# Patient Record
Sex: MALE | Race: WHITE | Employment: OTHER | URBAN - METROPOLITAN AREA
[De-identification: names, ages, dates, MRNs, and addresses within clinical notes are randomized per-mention and may not be internally consistent; named-entity substitution may affect disease eponyms.]

---

## 2017-01-10 RX ORDER — CLONAZEPAM 1 MG/1
TABLET ORAL
Qty: 60 TAB | Refills: 0 | Status: SHIPPED | OUTPATIENT
Start: 2017-01-10 | End: 2017-02-10 | Stop reason: SDUPTHER

## 2017-01-10 NOTE — TELEPHONE ENCOUNTER
Approved clonazepam refill.   Please notify patient that he will need to be seen for 3 month follow-up before the next refill

## 2017-02-09 RX ORDER — CLONAZEPAM 1 MG/1
TABLET ORAL
Qty: 60 TAB | Refills: 0 | OUTPATIENT
Start: 2017-02-09

## 2017-02-09 NOTE — TELEPHONE ENCOUNTER
Refused clonazepam refill. Last appointment October. Needs to be seen every 3 months for controlled substance refill.   Needs appointment

## 2017-02-10 RX ORDER — CLONAZEPAM 1 MG/1
1 TABLET ORAL
Qty: 60 TAB | Refills: 0 | Status: SHIPPED | OUTPATIENT
Start: 2017-02-10 | End: 2017-04-25 | Stop reason: DRUGHIGH

## 2017-02-10 NOTE — TELEPHONE ENCOUNTER
Approved clonazepam refill. I would rather see him sooner than April 25 before the next refill. Has been too long since his last appointment and really need to see him every 3 months.

## 2017-02-10 NOTE — TELEPHONE ENCOUNTER
Chief Complaint   Patient presents with    Medication Refill     Last refill:   4 weeks ago (1/10/2017)        clonazePAM (KLONOPIN) 1 mg tablet       TAKE 1 TABLET BY MOUTH TWICE DAILY AS NEEDED FOR ANXIETY       Dispense: 60 Tab     Refills: 0     Start: 1/10/2017     By: Kathy Mendes MD       : 1/11/17

## 2017-02-10 NOTE — TELEPHONE ENCOUNTER
Patient requesting refill.  He has an appointment on April 25 with Dr Jose Conklin, but if he needs to make a sooner appointment to get his med, he can. callback number is [phone]

## 2017-04-25 ENCOUNTER — HOSPITAL ENCOUNTER (OUTPATIENT)
Dept: LAB | Age: 72
Discharge: HOME | End: 2017-04-25
Payer: MEDICARE

## 2017-04-25 ENCOUNTER — TELEPHONE (OUTPATIENT)
Dept: FAMILY MEDICINE CLINIC | Age: 72
End: 2017-04-25

## 2017-04-25 ENCOUNTER — OFFICE VISIT (OUTPATIENT)
Dept: FAMILY MEDICINE CLINIC | Age: 72
End: 2017-04-25

## 2017-04-25 VITALS
SYSTOLIC BLOOD PRESSURE: 162 MMHG | WEIGHT: 208 LBS | OXYGEN SATURATION: 98 % | TEMPERATURE: 98 F | HEART RATE: 83 BPM | BODY MASS INDEX: 30.81 KG/M2 | DIASTOLIC BLOOD PRESSURE: 84 MMHG | RESPIRATION RATE: 16 BRPM | HEIGHT: 69 IN

## 2017-04-25 DIAGNOSIS — F41.9 ANXIETY: Primary | ICD-10-CM

## 2017-04-25 DIAGNOSIS — Z11.59 NEED FOR HEPATITIS C SCREENING TEST: ICD-10-CM

## 2017-04-25 DIAGNOSIS — Z23 ENCOUNTER FOR IMMUNIZATION: ICD-10-CM

## 2017-04-25 DIAGNOSIS — R73.02 IGT (IMPAIRED GLUCOSE TOLERANCE): ICD-10-CM

## 2017-04-25 DIAGNOSIS — I10 ESSENTIAL HYPERTENSION: ICD-10-CM

## 2017-04-25 DIAGNOSIS — E78.5 HYPERLIPIDEMIA, UNSPECIFIED HYPERLIPIDEMIA TYPE: ICD-10-CM

## 2017-04-25 PROCEDURE — 83036 HEMOGLOBIN GLYCOSYLATED A1C: CPT

## 2017-04-25 PROCEDURE — 86803 HEPATITIS C AB TEST: CPT

## 2017-04-25 PROCEDURE — 36415 COLL VENOUS BLD VENIPUNCTURE: CPT

## 2017-04-25 PROCEDURE — 80061 LIPID PANEL: CPT

## 2017-04-25 PROCEDURE — 80053 COMPREHEN METABOLIC PANEL: CPT

## 2017-04-25 RX ORDER — CLONAZEPAM 0.5 MG/1
0.5 TABLET ORAL
Qty: 60 TAB | Refills: 0 | Status: SHIPPED | OUTPATIENT
Start: 2017-04-25

## 2017-04-25 RX ORDER — SERTRALINE HYDROCHLORIDE 25 MG/1
25 TABLET, FILM COATED ORAL DAILY
Qty: 90 TAB | Refills: 1 | Status: SHIPPED | OUTPATIENT
Start: 2017-04-25

## 2017-04-25 NOTE — PROGRESS NOTES
SANAZ  Aamir Salazar is a 80 y.o. male who presents to follow-up on his chronic medical conditions but ended up spending the entire visit talking about his anxiety. Has a remarkable amount of insight because of the reading he has done. By way of update he told me what has been going on for the last few years. For about 45 years he has had a headache . Has been to neurologist had a scan, took a \"whole bunch of pills\" but nothing worked. He cleaned his house which reduced his headaches by half almost instantly. Says that he let his house get a little bit dirty since his wife  years ago. After his wife  he went through a period of morning for about a year before he started to experience the symptoms of anxiety. Was started on clonazepam 1 mg twice daily which he has taken habitually since. Saw on the news that benzos are bad and tried to wean himself off and got as far as 1x 1 mg tab in the afternoon but was unable to wean himself further than that because he was having some mild hightened states of anxiety and some mild panic attacks. To his credit he recognizes some of these as \"fake heart attack\". He is very nervous about having another heart attack and will sometimes get chest pain that goes to his left arm. Ends up using Aspercreme which helps to massage the pain away. Takes reassurance and the fact that if this topical therapy works then it is unlikely to be a heart attack. Does not tell me how often this is happening. The other kind of panic attack feels like it is affecting his entire body, his legs get rubbery and weak. Actually says \"it feels like there is a homunculus in my head screwing with me\".   Seems like these happen more often at night, actually wake him up multiple times a week    PMHx:  Past Medical History:   Diagnosis Date    Anxiety 2010    CAD (coronary artery disease) 2010    Headache     Hyperglycemia 2010    Hyperlipidemia 2010  Hypertension 2/22/2010       Meds:   Current Outpatient Prescriptions   Medication Sig Dispense Refill    clonazePAM (KLONOPIN) 0.5 mg tablet Take 1 Tab by mouth nightly as needed. Max Daily Amount: 0.5 mg. 60 Tab 0    diph,pertuss,acel,,tetanus vac,PF, (ADACEL) 2 Lf-(2.5-5-3-5 mcg)-5Lf/0.5 mL syrg vaccine 0.5 mL by IntraMUSCular route once for 1 dose. 0.5 mL 0    sertraline (ZOLOFT) 25 mg tablet Take 1 Tab by mouth daily. 90 Tab 1    omeprazole (PRILOSEC) 20 mg capsule Take 1 Cap by mouth daily. 90 Cap 3    simvastatin (ZOCOR) 40 mg tablet Take 1 Tab by mouth nightly. 60 Tab 5    losartan (COZAAR) 50 mg tablet Take 1 Tab by mouth daily. 90 Tab 3    metoprolol succinate (TOPROL-XL) 50 mg XL tablet TAKE 1 TABLET BY MOUTH TWO (2) TIMES A DAY FOR HEART 180 Tab 3    loratadine (CLARITIN) 10 mg tablet Take 10 mg by mouth.  aspirin (ASPIRIN) 325 mg tablet Take 325 mg by mouth daily.  omega-3 fatty acids-vitamin e (FISH OIL) 1,000 mg Cap Take  by mouth two (2) times a day. Allergies:   No Known Allergies    Smoker:  History   Smoking Status    Never Smoker   Smokeless Tobacco    Never Used       ETOH:   History   Alcohol Use    4.8 oz/week    8 Glasses of wine per week       FH:   Family History   Problem Relation Age of Onset    Heart Disease Mother     Psychiatric Disorder Father     Dementia Father     Heart Disease Brother        ROS:  As listed in HPI. In addition:  Constitutional:   No headache, fever, fatigue, weight loss or weight gain      Eyes:   No redness, pruritis, pain, visual changes, swelling, or discharge      Ears:    No pain, loss or changes in hearing     Cardiac:    No chest pain      Resp:   No cough or shortness of breath      Neuro   No loss of consciousness, dizziness, seizures      Physical Exam:  Blood pressure 162/84, pulse 83, temperature 98 °F (36.7 °C), resp. rate 16, height 5' 9\" (1.753 m), weight 208 lb (94.3 kg), SpO2 98 %. GEN: No apparent distress. Alert and oriented and responds to all questions appropriately. NEUROLOGIC:  No focal neurologic deficits. Strength and sensation grossly intact. Coordination and gait grossly intact. EXT: Well perfused. No edema. SKIN: No obvious rashes. Assessment and Plan     Went over the biologic basis for anxiety. I agree that he needs to come off the clonazepam.  Talked about controller medicines versus benzos. Talked about why benzos are bad for sleep. He is on board, agrees to Zoloft ramp-up. After 4 weeks we will start trying to back off on clonazepam.  I think a reasonable goal will be to switch to Ativan for occasional and as needed use for the panic attacks. Explained the panic attacks will decrease in frequency with appropriate use of Zoloft but might still happen on occasion. Declines therapy, this is unfortunate because I think he would derive great benefit. Did not discuss exercise    He is writing a book. Wants to finish that and then he feels like his life work would be done. Did mention that he thinks about death and said \"I kind of want to die\" but denies suicidal ideation. Said this in a world weary way by way of declining preventative healthcare    He really does not want to [facility] to [facility].  Anything that we do he wants to handle in house here if at all possible. We did not go over his health maintenance in detail because not enough time so I will address this in the future. Went ahead and sudhakar all of his surveillance labs. We will need to talk about a FIT test sometime in the near future. Had a glaucoma screening done recently but does not remember where, he will let us know          ICD-10-CM ICD-9-CM    1. Anxiety F41.9 300.00 clonazePAM (KLONOPIN) 0.5 mg tablet      sertraline (ZOLOFT) 25 mg tablet   2. Essential hypertension P98 249.2 METABOLIC PANEL, COMPREHENSIVE   3.  Hyperlipidemia, unspecified hyperlipidemia type E78.5 272.4 LIPID PANEL   4. Encounter for immunization Z23 V03.89 diph,pertuss,acel,,tetanus vac,PF, (ADACEL) 2 Lf-(2.5-5-3-5 mcg)-5Lf/0.5 mL syrg vaccine   5. Need for hepatitis C screening test Z11.59 V73.89 HCV AB W/RFLX TO KATHE   6. IGT (impaired glucose tolerance) R73.02 790.22 HEMOGLOBIN A1C WITH EAG       AVS given.  Pt expressed understanding of instructions

## 2017-04-25 NOTE — PROGRESS NOTES
.  Chief Complaint   Patient presents with    Hypertension    Annual Wellness Visit     . Missy Palomino

## 2017-04-25 NOTE — MR AVS SNAPSHOT
Visit Information Date & Time Provider Department Dept. Phone Encounter #  
 4/25/2017  8:40 AM Al Ocampo MD [address] [facility] [phone] [id number] Upcoming Health Maintenance Date Due Hepatitis C Screening 1945 DTaP/Tdap/Td series (1 - Tdap) 11/24/1967 FOBT Q 1 YEAR AGE 50-75 11/24/1996 GLAUCOMA SCREENING Q2Y 11/24/2011 MEDICARE YEARLY EXAM 10/28/2016 Allergies as of 4/25/2017  Review Complete On: 4/25/2017 By: Ervin Mesa No Known Allergies Current Immunizations  Reviewed on 10/26/2016 Name Date Influenza High Dose Vaccine PF 10/26/2016, 10/28/2015, 10/28/2014 Influenza Vaccine 10/8/2013 Influenza Vaccine Split 9/11/2012, 10/20/2011, 10/19/2010 Pneumococcal Conjugate (PCV-13) 4/28/2015 Pneumococcal Vaccine (Unspecified Type) 8/3/2012 Zoster Vaccine, Live 1/1/2005 Not reviewed this visit You Were Diagnosed With   
  
 Codes Comments Anxiety    -  Primary ICD-10-CM: F41.9 ICD-9-CM: 300.00 Essential hypertension     ICD-10-CM: I10 
ICD-9-CM: 401.9 Hyperlipidemia, unspecified hyperlipidemia type     ICD-10-CM: E78.5 ICD-9-CM: 272.4 Encounter for immunization     ICD-10-CM: O45 ICD-9-CM: V03.89 Need for hepatitis C screening test     ICD-10-CM: Z11.59 
ICD-9-CM: V73.89 IGT (impaired glucose tolerance)     ICD-10-CM: R73.02 
ICD-9-CM: 790.22 Vitals BP Pulse Temp Resp Height(growth percentile) Weight(growth percentile) 162/84 (BP 1 Location: Left arm, BP Patient Position: Sitting) 83 98 °F (36.7 °C) 16 5' 9\" (1.753 m) 208 lb (94.3 kg) SpO2 BMI Smoking Status 98% 30.72 kg/m2 Never Smoker BMI and BSA Data Body Mass Index Body Surface Area 30.72 kg/m 2 2.14 m 2 Preferred Pharmacy Pharmacy Name Phone [facility] [address] [phone] Your Updated Medication List  
  
   
 This list is accurate as of: 17  9:25 AM.  Always use your most recent med list.  
  
  
  
  
 aspirin 325 mg tablet Commonly known as:  ASPIRIN Take 325 mg by mouth daily. CLARITIN 10 mg tablet Generic drug:  loratadine Take 10 mg by mouth.  
  
 clonazePAM 0.5 mg tablet Commonly known as:  Elsa Tariq Take 1 Tab by mouth nightly as needed. Max Daily Amount: 0.5 mg.  
  
 diph,pertuss(acel),tetanus vac(PF) 2 Lf-(2.5-5-3-5 mcg)-5Lf/0.5 mL Syrg vaccine Commonly known as:  ADACEL  
0.5 mL by IntraMUSCular route once for 1 dose. FISH OIL 1,000 mg Cap Generic drug:  omega-3 fatty acids-vitamin e Take  by mouth two (2) times a day. losartan 50 mg tablet Commonly known as:  COZAAR Take 1 Tab by mouth daily. metoprolol succinate 50 mg XL tablet Commonly known as:  TOPROL-XL  
TAKE 1 TABLET BY MOUTH TWO (2) TIMES A DAY FOR HEART  
  
 omeprazole 20 mg capsule Commonly known as:  PRILOSEC Take 1 Cap by mouth daily. sertraline 25 mg tablet Commonly known as:  ZOLOFT Take 1 Tab by mouth daily. simvastatin 40 mg tablet Commonly known as:  ZOCOR Take 1 Tab by mouth nightly. Prescriptions Printed Refills  
 clonazePAM (KLONOPIN) 0.5 mg tablet 0 Sig: Take 1 Tab by mouth nightly as needed. Max Daily Amount: 0.5 mg.  
 Class: Print Route: Oral  
 diph,pertuss,acel,,tetanus vac,PF, (ADACEL) 2 Lf-(2.5-5-3-5 mcg)-5Lf/0.5 mL syrg vaccine 0 Si.5 mL by IntraMUSCular route once for 1 dose. Class: Print Route: IntraMUSCular Prescriptions Sent to Pharmacy Refills  
 sertraline (ZOLOFT) 25 mg tablet 1 Sig: Take 1 Tab by mouth daily. Class: Normal  
 Pharmacy: [facility] [id number], [address] [place] [address] #: [phone] Route: Oral  
  
We Performed the Following HCV AB W/RFLX TO KATHE [03175 CPT(R)] HEMOGLOBIN A1C WITH EAG [67891 CPT(R)] LIPID PANEL [31175 CPT(R)] METABOLIC PANEL, COMPREHENSIVE [34473 CPT(R)] Introducing \[facility]\"" & HEALTH SERVICES! Ghanshyam Velázquez introduces [employer] patient portal. Now you can access parts of your medical record, email your doctor's office, and request medication refills online. 1. In your internet browser, go to [email or web address] 2. Click on the First Time User? Click Here link in the Sign In box. You will see the New Member Sign Up page. 3. Enter your [employer] Access Code exactly as it appears below. You will not need to use this code after youve completed the sign-up process. If you do not sign up before the expiration date, you must request a new code. · [employer] Access Code: [email or web address] Expires: 7/24/2017  9:25 AM 
 
4. Enter the last four digits of your Social Security Number (xxxx) and Date of Birth (mm/dd/yyyy) as indicated and click Submit. You will be taken to the next sign-up page. 5. Create a [employer] ID. This will be your [employer] login ID and cannot be changed, so think of one that is secure and easy to remember. 6. Create a [employer] password. You can change your password at any time. 7. Enter your Password Reset Question and Answer. This can be used at a later time if you forget your password. 8. Enter your e-mail address. You will receive e-mail notification when new information is available in [address] 9. Click Sign Up. You can now view and download portions of your medical record. 10. Click the Download Summary menu link to download a portable copy of your medical information. If you have questions, please visit the Frequently Asked Questions section of the [employer] website. Remember, [employer] is NOT to be used for urgent needs. For medical emergencies, dial 911. Now available from your iPhone and Android! Please provide this summary of care documentation to your next provider. If you have any questions after today's visit, please call [phone].

## 2017-04-26 LAB
ALBUMIN SERPL-MCNC: 4.8 G/DL (ref 3.5–4.8)
ALBUMIN/GLOB SERPL: 1.9 {RATIO} (ref 1.2–2.2)
ALP SERPL-CCNC: 71 IU/L (ref 39–117)
ALT SERPL-CCNC: 26 IU/L (ref 0–44)
AST SERPL-CCNC: 15 IU/L (ref 0–40)
BILIRUB SERPL-MCNC: 0.4 MG/DL (ref 0–1.2)
BUN SERPL-MCNC: 18 MG/DL (ref 8–27)
BUN/CREAT SERPL: 15 (ref 10–24)
CALCIUM SERPL-MCNC: 9.4 MG/DL (ref 8.6–10.2)
CHLORIDE SERPL-SCNC: 97 MMOL/L (ref 96–106)
CHOLEST SERPL-MCNC: 195 MG/DL (ref 100–199)
CO2 SERPL-SCNC: 26 MMOL/L (ref 18–29)
CREAT SERPL-MCNC: 1.19 MG/DL (ref 0.76–1.27)
EST. AVERAGE GLUCOSE BLD GHB EST-MCNC: 120 MG/DL
GLOBULIN SER CALC-MCNC: 2.5 G/DL (ref 1.5–4.5)
GLUCOSE SERPL-MCNC: 116 MG/DL (ref 65–99)
HBA1C MFR BLD: 5.8 % (ref 4.8–5.6)
HCV AB S/CO SERPL IA: <0.1 S/CO RATIO (ref 0–0.9)
HCV AB SERPL QL IA: NORMAL
HDLC SERPL-MCNC: 57 MG/DL
INTERPRETATION, 910389: NORMAL
LDLC SERPL CALC-MCNC: 109 MG/DL (ref 0–99)
POTASSIUM SERPL-SCNC: 4.9 MMOL/L (ref 3.5–5.2)
PROT SERPL-MCNC: 7.3 G/DL (ref 6–8.5)
SODIUM SERPL-SCNC: 138 MMOL/L (ref 134–144)
TRIGL SERPL-MCNC: 145 MG/DL (ref 0–149)
VLDLC SERPL CALC-MCNC: 29 MG/DL (ref 5–40)

## 2017-07-08 ENCOUNTER — DOCUMENTATION ONLY (OUTPATIENT)
Dept: INTERNAL MEDICINE CLINIC | Age: 72
End: 2017-07-08

## 2017-07-08 ENCOUNTER — TELEPHONE (OUTPATIENT)
Dept: INTERNAL MEDICINE CLINIC | Age: 72
End: 2017-07-08

## 2017-07-08 NOTE — PROGRESS NOTES
Received phone call from [address] [place] [address]. Liu from [facility] office. He was at the home of the patient who was found  in his  and medical examiner on the way to examine patient.   Wanted information regarding last visit with Dr. Jessi Koroma and any other [place] [place] provider